# Patient Record
Sex: FEMALE | Race: WHITE
[De-identification: names, ages, dates, MRNs, and addresses within clinical notes are randomized per-mention and may not be internally consistent; named-entity substitution may affect disease eponyms.]

---

## 2020-02-22 ENCOUNTER — HOSPITAL ENCOUNTER (INPATIENT)
Dept: HOSPITAL 25 - ED | Age: 60
LOS: 3 days | Discharge: HOME | DRG: 190 | End: 2020-02-25
Attending: INTERNAL MEDICINE | Admitting: INTERNAL MEDICINE
Payer: COMMERCIAL

## 2020-02-22 DIAGNOSIS — Z82.49: ICD-10-CM

## 2020-02-22 DIAGNOSIS — J44.9: ICD-10-CM

## 2020-02-22 DIAGNOSIS — I21.4: Primary | ICD-10-CM

## 2020-02-22 DIAGNOSIS — I10: ICD-10-CM

## 2020-02-22 DIAGNOSIS — F17.210: ICD-10-CM

## 2020-02-22 DIAGNOSIS — Z83.3: ICD-10-CM

## 2020-02-22 DIAGNOSIS — I25.10: ICD-10-CM

## 2020-02-22 LAB
ALBUMIN SERPL BCG-MCNC: 4.1 G/DL (ref 3.2–5.2)
ALBUMIN/GLOB SERPL: 1.2 {RATIO} (ref 1–3)
ALP SERPL-CCNC: 89 U/L (ref 34–104)
ALT SERPL W P-5'-P-CCNC: 18 U/L (ref 7–52)
ANION GAP SERPL CALC-SCNC: 8 MMOL/L (ref 2–11)
AST SERPL-CCNC: 19 U/L (ref 13–39)
BASOPHILS # BLD AUTO: 0 10^3/UL (ref 0–0.2)
BASOPHILS # BLD AUTO: 0.1 10^3/UL (ref 0–0.2)
BUN SERPL-MCNC: 16 MG/DL (ref 6–24)
BUN SERPL-MCNC: 17 MG/DL (ref 6–24)
BUN/CREAT SERPL: 19.5 (ref 8–20)
CALCIUM SERPL-MCNC: 9.1 MG/DL (ref 8.6–10.3)
CHLORIDE SERPL-SCNC: 107 MMOL/L (ref 101–111)
EOSINOPHIL # BLD AUTO: 0.1 10^3/UL (ref 0–0.6)
EOSINOPHIL # BLD AUTO: 0.2 10^3/UL (ref 0–0.6)
GLOBULIN SER CALC-MCNC: 3.3 G/DL (ref 2–4)
GLUCOSE SERPL-MCNC: 95 MG/DL (ref 70–100)
HCO3 SERPL-SCNC: 21 MMOL/L (ref 22–32)
HCT VFR BLD AUTO: 39 % (ref 35–47)
HCT VFR BLD AUTO: 39 % (ref 35–47)
HGB BLD-MCNC: 13 G/DL (ref 12–16)
HGB BLD-MCNC: 13.1 G/DL (ref 12–16)
LYMPHOCYTES # BLD AUTO: 1.6 10^3/UL (ref 1–4.8)
LYMPHOCYTES # BLD AUTO: 2.9 10^3/UL (ref 1–4.8)
MCH RBC QN AUTO: 28 PG (ref 27–31)
MCH RBC QN AUTO: 29 PG (ref 27–31)
MCHC RBC AUTO-ENTMCNC: 34 G/DL (ref 31–36)
MCHC RBC AUTO-ENTMCNC: 34 G/DL (ref 31–36)
MCV RBC AUTO: 84 FL (ref 80–97)
MCV RBC AUTO: 85 FL (ref 80–97)
MONOCYTES # BLD AUTO: 0.7 10^3/UL (ref 0–0.8)
MONOCYTES # BLD AUTO: 0.7 10^3/UL (ref 0–0.8)
NEUTROPHILS # BLD AUTO: 3.6 10^3/UL (ref 1.5–7.7)
NEUTROPHILS # BLD AUTO: 5.5 10^3/UL (ref 1.5–7.7)
NRBC # BLD AUTO: 0 10^3/UL
NRBC # BLD AUTO: 0 10^3/UL
NRBC BLD QL AUTO: 0
NRBC BLD QL AUTO: 0.1
PLATELET # BLD AUTO: 318 10^3/UL (ref 150–450)
PLATELET # BLD AUTO: 347 10^3/UL (ref 150–450)
POTASSIUM SERPL-SCNC: 4.1 MMOL/L (ref 3.5–5)
PROT SERPL-MCNC: 7.4 G/DL (ref 6.4–8.9)
RBC # BLD AUTO: 4.59 10^6 /UL (ref 3.7–4.87)
RBC # BLD AUTO: 4.61 10^6 /UL (ref 3.7–4.87)
SODIUM SERPL-SCNC: 136 MMOL/L (ref 135–145)
TROPONIN I SERPL-MCNC: 0 NG/ML (ref ?–0.03)
TROPONIN I SERPL-MCNC: 0.07 NG/ML (ref ?–0.03)
TROPONIN I SERPL-MCNC: 0.51 NG/ML (ref ?–0.03)
WBC # BLD AUTO: 7.5 10^3/UL (ref 3.5–10.8)
WBC # BLD AUTO: 7.9 10^3/UL (ref 3.5–10.8)

## 2020-02-22 PROCEDURE — 80053 COMPREHEN METABOLIC PANEL: CPT

## 2020-02-22 PROCEDURE — 84443 ASSAY THYROID STIM HORMONE: CPT

## 2020-02-22 PROCEDURE — 80048 BASIC METABOLIC PNL TOTAL CA: CPT

## 2020-02-22 PROCEDURE — 99157 MOD SED OTHER PHYS/QHP EA: CPT

## 2020-02-22 PROCEDURE — 71046 X-RAY EXAM CHEST 2 VIEWS: CPT

## 2020-02-22 PROCEDURE — 84484 ASSAY OF TROPONIN QUANT: CPT

## 2020-02-22 PROCEDURE — G0378 HOSPITAL OBSERVATION PER HR: HCPCS

## 2020-02-22 PROCEDURE — 85379 FIBRIN DEGRADATION QUANT: CPT

## 2020-02-22 PROCEDURE — 93306 TTE W/DOPPLER COMPLETE: CPT

## 2020-02-22 PROCEDURE — 82565 ASSAY OF CREATININE: CPT

## 2020-02-22 PROCEDURE — 82248 BILIRUBIN DIRECT: CPT

## 2020-02-22 PROCEDURE — 99156 MOD SED OTH PHYS/QHP 5/>YRS: CPT

## 2020-02-22 PROCEDURE — 85025 COMPLETE CBC W/AUTO DIFF WBC: CPT

## 2020-02-22 PROCEDURE — 93454 CORONARY ARTERY ANGIO S&I: CPT

## 2020-02-22 PROCEDURE — 84520 ASSAY OF UREA NITROGEN: CPT

## 2020-02-22 PROCEDURE — 99284 EMERGENCY DEPT VISIT MOD MDM: CPT

## 2020-02-22 PROCEDURE — 80061 LIPID PANEL: CPT

## 2020-02-22 PROCEDURE — 83036 HEMOGLOBIN GLYCOSYLATED A1C: CPT

## 2020-02-22 PROCEDURE — 93005 ELECTROCARDIOGRAM TRACING: CPT

## 2020-02-22 PROCEDURE — 85730 THROMBOPLASTIN TIME PARTIAL: CPT

## 2020-02-22 PROCEDURE — 36415 COLL VENOUS BLD VENIPUNCTURE: CPT

## 2020-02-22 RX ADMIN — WATER SCH NOTE: 100 INJECTION, SOLUTION INTRAVENOUS at 20:40

## 2020-02-22 RX ADMIN — ATORVASTATIN CALCIUM SCH MG: 40 TABLET, FILM COATED ORAL at 20:29

## 2020-02-22 RX ADMIN — METOPROLOL TARTRATE SCH MG: 25 TABLET, FILM COATED ORAL at 20:33

## 2020-02-22 RX ADMIN — HEPARIN SODIUM AND DEXTROSE SCH MLS/HR: 5000; 5 INJECTION INTRAVENOUS at 20:24

## 2020-02-22 RX ADMIN — ACETAMINOPHEN PRN MG: 325 TABLET ORAL at 20:28

## 2020-02-22 RX ADMIN — NICOTINE SCH PATCH: 21 PATCH TRANSDERMAL at 17:19

## 2020-02-22 NOTE — ED
Shortness of Breath





- HPI Summary


HPI Summary: 





This pt is a 61 Y/O F presenting to [facility] with a CC of SOB that began at 0900 

this morning. Per EMS the pt was brought to the fire station by her  due 

to an increase in SOB and mid-sternal CP. She states that at the onset she had 

a headache. She states that she has never felt this way in the past. She 

reports that she had some diaphoresis during the episode. She denies any Edema, 

fevers, chills, MIs, N/V, and long distance travels. She states that her mother

s mom had a Hx of cardiac disease and had an MI at 70 ten. She states that she 

has been smoking cigarettes for a couple of years. She denies any PMHx. She has 

no aggravating factors. She states that her breathing is improving due to the 

O2. 





- History of Current Complaint


Chief Complaint: EDChestPainROMI


Time Seen by Provider: 20 11:17


Hx Obtained From: Patient, EMS


Onset/Duration: Sudden Onset, Lasting Hours - 2, Still Present


Timing: Constant


Current Severity: None


Dyspnea At: Rest


Aggravating Factors: Nothing


Alleviating Factors: Oxygen


Associated Signs & Symptoms: Negative - Edema, fevers, chills, MIs, N/V, and 

long distance travels., Chest Pain Unrelated to Cough, Diaphoresis





- Allergy/Home Medications


Allergies/Adverse Reactions: 


 Allergies











Allergy/AdvReac Type Severity Reaction Status Date / Time


 


No Known Allergies Allergy   Verified 20 11:17














PMH/Surg Hx/FS Hx/Imm Hx


Previously Healthy: Yes


Endocrine/Hematology History: 


   Denies: Hx Anemia


Cardiovascular History: 


   Denies: Hx Hypertension, Hx Valvular Heart Disease


Neurological History: 


   Denies: Hx Headaches


Psychiatric History: 


   Denies: Hx Autism





- Cancer History


Hx Chemotherapy: No


Hx Radiation Therapy: No





- Surgical History


Surgical History: None





- Immunization History


Immunizations Up to Date: Yes


Infectious Disease History: No


Infectious Disease History: 


   Denies: Traveled Outside the [place] in Last 30 Days





- Family History


Known Family History: Positive: Cardiac Disease





- Social History


Occupation: Employed Full-time


Lives: With Family


Alcohol Use: None


Hx Substance Use: No


Substance Use Type: Reports: None


Hx Tobacco Use: Yes


Smoking Status (MU): Current Every Day Smoker





Review of Systems


Constitutional: Negative - long distance travels


Positive: Skin Diaphoresis.  Negative: Fever, Chills


Positive: Chest Pain


Positive: Shortness Of Breath


Negative: Vomiting, Nausea


Negative: Edema


Negative: Headache


All Other Systems Reviewed And Are Negative: Yes





Physical Exam





- Summary


Physical Exam Summary: 


Constitutional: Well-developed, Well-nourished, Alert. (-) Distressed


Skin: Warm, Dry


HENT: Normocephalic; Atraumatic


Eyes: Conjunctiva normal


Neck: Musculoskeletal ROM normal neck. (-) JVD, (-) Stridor, (-) Tracheal 

deviation


Cardio: Rhythm regular, rate normal, Heart sounds normal; Intact distal pulses; 

The pedal pulses are 2+ and symmetric. Radial pulses are 2+ and symmetric. (-) 

Murmur


Pulmonary/Chest wall: Effort normal. (-) Respiratory distress, (-) Wheezes, (-) 

Rales


Abd: Soft, (-) tenderness, (-) Distension, (-) Guarding, (-) Rebound


Musculoskeletal: (-) Edema


Lymph: (-) Cervical adenopathy


Neuro: Alert, Oriented x3


Psych: Mood and affect Normal





Triage Information Reviewed: Yes


Vital Signs On Initial Exam: 


 Initial Vitals











Temp Pulse Resp BP Pulse Ox


 


 99.0 F   95   24   175/102   100 


 


 20 11:12  20 11:12  20 11:12  20 11:12  20 11:12











Vital Signs Reviewed: Yes





Procedures





- Sedation


Patient Received Moderate/Deep Sedation with Procedure: No





Diagnostics





- Vital Signs


 Vital Signs











  Temp Pulse Resp BP Pulse Ox


 


 20 11:12  99.0 F  95  24  175/102  100














- Laboratory


Result Diagrams: 


 20 11:26





 20 11:26


Lab Statement: Any lab studies that have been ordered have been reviewed, and 

results considered in the medical decision making process.





- Radiology


  ** CXR


Radiology Interpretation Completed By: Radiologist


Summary of Radiographic Findings: HYPERINFLATION, CONSISTENT WITH COPD. NO 

ACTIVE CARDIOPULMONARY DISEASE.  ED physician has reviewed this report.





- EKG


  ** 1115


Cardiac Rate: NL - 89 BPM


EKG Rhythm: Sinus Rhythm


ST Segment: Other


Summary of EKG Findings: EKG at 1115 reveals normal sinus tachycardia with rate 

of 89 BPM, 1 mm ST elevation in lead 2, 3. No reciprocal changes, no ischemic 

changes. This EKG was reviewed and interpreted by Dr. Dias, 1116 2020.





Re-Evaluation





- Re-Evaluation


  ** First Eval


Re-Evaluation Time: 14:50


Comment: Discussed plan for admission d/t elevated second trop of 0.07, patient 

agreeable with plan, ASA ordered





Course/Dx





- Course


Course Of Treatment: Patient is here with a roughly 10 minutes of chest pain 

with accompanied shortness of breath.  Episode occurred at 9 AM.  Patient has 

been going through a lot of stress as her brother-in-law  last week.  

Patient was chest pain-free upon arrival but was still mildly short of breath.  

Patient had a d-dimer which was negative.  Patient had an EKG which showed no 

evidence of ischemia.  Patient's initial troponin was negative.  Patient's 

second troponin was positive so she was admitted to the hospital for further 

workup.  Patient received aspirin in the ED.  Patient was not started on 

heparin after discussion was had with the hospitalist about starting it.





- Diagnoses


Provider Diagnoses: 


 NSTEMI (non-ST elevated myocardial infarction)








- Physician Notifications


Discussed Care of Patient With: Esther Gaffney - hospitalist


Time Discussed With Above Provider: 15:05


Instructed by Provider To: Other - I discussed the patients case with Dr. Gaffney

, who accepts the patient for admission.





Discharge ED





- Sign-Out/Discharge


Documenting (check all that apply): Patient Departure - Patient accepted for 

admission by Dr. Gaffney.





- Discharge Plan


Condition: Stable


Disposition: ADMITTED TO [facility]





- Billing Disposition and Condition


Condition: STABLE


Disposition: Admitted to [facility]





- Attestation Statements


Document Initiated by Chocoe: Yes


Documenting Scribe: Serina Vergara


Provider For Whom Eloy is Documenting (Include Credential): Jeff Dias MD


Scribe Attestation: 


Sherif NICOLE Natalie George, scribed for Jeff Dias MD on 20 at 

1825. 


Scribe Documentation Reviewed: Yes


Provider Attestation: 


The documentation as recorded by the eloy, Serina Vergara 

accurately reflects the service I personally performed and the decisions made 

by me, Jeff Dias MD


Status of Scribe Document: Viewed

## 2020-02-22 NOTE — HP
HISTORY AND PHYSICAL:

 

DATE OF ADMISSION:  20

 

PRIMARY CARE PROVIDER:  The patient has no primary care provider, therefore she 
should be referred to [facility] upon discharge.

 

ATTENDING PHYSICIAN:  Esther Gaffney MD * (dictated by Rea Antunez NP)
.

 

CHIEF COMPLAINT:

1.  Shortness of breath.

2.  Chest pain.

3.  Headache.

 

HISTORY OF PRESENT ILLNESS:  Mrs. Mitchell is a 60-year-old female with no past 
medical history who presented to the emergency department today after 
experiencing shortness of breath, chest pain, and headache.  The patient 
reports that she has been dealing with lot of stress recently and her brother 
passed away and she has been dealing with arranging for his .  The 
patient reports she has had a headache for about 4 days "off and on."  She 
reports she suddenly had worse headache today that lasted about 30 minutes.  
During this headache, she started to have chest "tightness" and then could not 
catch her breath.  She reports these symptoms started about 09:00 and lasted to 
about 10:30 when her  arrived home.  She reported they decreased in 
severity when her  arrived home.  She reports they finally went away 
when she was en route to the ER in the ambulance. She reports they did not give 
her any intervention besides oxygen in the ambulance. She reports associated 
symptom was diaphoresis.  She also reports that she has had an occasional cough 
only in the morning.  She reports she has also had some nausea en route to the 
hospital.  The patient denies fever, anorexia, edema, hemoptysis, vomiting, 
diarrhea, abdominal pain, gross hematuria, dysuria, focal weakness, visual 
changes, sore throat, arthralgias, myalgias, rashes, lesions.  The patient 
reports recently she has noted her right middle and ring finger become white 
and cold and tingly and this lasted few minutes.  She reports this has been 
going on for couple of weeks.

 

While here in the emergency department, the patient has received 324 of 
aspirin.  She has had a CBC, which is unremarkable.  She has had a D-dimer, 
which is unremarkable.  She had a CMP, which was fairly unremarkable.  Her 
initial troponin was 0.00 at 11:26, but her repeat troponin was 0.07 at 14:14.  
The patient's EKG revealed sinus rhythm with nonspecific ST changes in V1 and 
V2.  The patient had a chest x-ray, which showed hyperinflation consistent with 
COPD.  Given these findings, the hospitalists were asked to consult for 
admission.

 

It should also be noted that the patient has reported decreased exercise 
endurance in the past couple of months.  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 
becomes more short of breath with activities than she normally would.  She 
reports she initially attributed this to a GI bug that she had in January.

 

PAST MEDICAL HISTORY:  The patient reports she has no past medical history.  It 
should also be noted that the patient has not seen a physician.

 

PAST SURGICAL HISTORY:

1.   x3.

2.  Tonsillectomy.

 

HOME MEDICATIONS:  Ibuprofen 200 mg as needed.

 

ALLERGIES:  No known drug allergies.

 

FAMILY HISTORY:  Mother had a heart attack in her 70s.  The patient has no 
information on her father.  The patient's biological mother had diabetes.  
There is no cancer in the family.

 

SOCIAL HISTORY:  The patient is a smoker.  The patient smokes a pack a day for 
approximately 40 years.  The patient denies alcohol use.  The patient denies 
drug use.  The patient works as a health .  The patient is .  The 
patient is a full code.  The patient's surrogate decision maker will be her 
, Clint Mitchell, [phone] in the event she cannot make decisions for 
herself.

 

REVIEW OF SYSTEMS:  A 14-point review of systems was performed and all 
pertinent positive and negative findings are in the HPI.  All others are 
negative.

 

                               PHYSICAL EXAMINATION

 

GENERAL:  Mrs. Mitchell is lying in bed.  She appears to be in no acute distress.  
She appears stated age.

 

VITAL SIGNS:  Temp 99.0, HR 79, RR 15, O2 saturation of 100% on room air, BP is 
180/100.

 

HEENT:  PERRLA.  EOMs intact.  Oral mucosa is moist without lesion.  Pharynx is 
clear.

 

NECK:  No lymphadenopathy.

 

RESPIRATORY:  Symmetrical chest expansion.  No accessory muscle use.

 

LUNGS:  Clear to auscultation.  No rhonchi, wheezes, or rubs.

 

CV:  Regular rate and rhythm.  S1, S2 present.  No murmurs, rubs, or gallops.

 

ABDOMEN:  Soft, nontender to palpation.  Bowel sounds normoactive.

 

EXTREMITIES:  Skin is warm and smooth bilaterally.  No edema.  Pedal pulses 2+ 
bilaterally.

 

MUSCULOSKELETAL:  No pain or deformities.

 

NEURO:  Awake, alert, oriented x4.  Strength is 5/5 in upper and lower 
extremities.

 

SKIN:  Grossly intact without lesions.

 

 DIAGNOSTIC STUDIES/LABORATORY DATA:  WBC 7.9, hemoglobin 13.0, hematocrit 39, 
platelets 318.  Sodium 139, potassium 4.1, chloride 107, carbon dioxide 21, BUN 
16, creatinine 0.82.  Troponin 0.00 and 0.07.

 

EKG:  Sinus rhythm, nonspecific ST changes in V1 and V2.

 

Chest x-ray:  Hyperinflation.

 

ASSESSMENT AND PLAN:  Mrs. Mitchell is a 60-year-old female with no past medical 
history who presented to the emergency department today with complaints of 
chest pain, shortness of breath, headache.  The patient will be admitted OBV:

 

1.  Chest pain.  The patient had an EKG in the emergency department, which 
revealed sinus rhythm with nonspecific ST changes in V1 and V2.  The patient 
had initial troponin which was 0.00 and repeat troponin, which was 0.07.  We 
have asked the lab to rerun 0.07 to address its accuracy.  We have also ordered 
additional troponin to be drawn tonight.  The patient will have EKGs 
accompanying her troponins.  The patient will be placed on tele.  The patient 
will have an echocardiogram.  The patient will have stress test on Monday.  The 
patient is started on aspirin 81 mg.  The patient received aspirin 324 mg while 
in the emergency department.  The patient will be started on beta blocker.  The 
patient will have lipid panel in the morning.  TSH ordered.  Hemoglobin A1c 
ordered.

2.  Shortness of breath.  The patient's chest x-ray is unremarkable.  The 
patient has a history of smoking.  The patient is oxygenating well on room air.
  We will continue to monitor.  We will complete the cardiac workup.

3.  Headache.  The patient is currently headache-free.  I will order Tylenol as 
needed.  The patient's neuro exam is unremarkable.

4.  Hypertension.  The patient is noted to be hypertensive here in the 
emergency department.  I have ordered the patient to be started on beta 
blocker.  I have also ordered hydralazine p.r.n.

5.  Smoking.  The patient has significant smoking history.  I will order 
nicotine patch.

6.  FEN:  The patient will be placed on heart-healthy diet and be n.p.o. after 
midnight on .

7.  Code status:  The patient is a full code.

8.  DVT prophylaxis:  Based on the DVT Risk Assessment, the patient is low 
risk.  I will order SCDs.

 

TIME SPENT:  Approximately 65 minutes was spent on this admission, greater than 
half the time was spent with patient and her , obtaining my history and 
performing physical exam and reviewing my plan of care.

 

This case has been reviewed with my attending, Dr. Gaffney, who is in agreement 
with my plan of care.

 

 Reviewed by REA ANTUNEZ NP 3/7/20 @ 0838

 

[id number]/CPS #: [id number]

[email or web address]

## 2020-02-23 LAB
ALBUMIN SERPL BCG-MCNC: 4.2 G/DL (ref 3.2–5.2)
ALBUMIN/GLOB SERPL: 1.3 {RATIO} (ref 1–3)
ALP SERPL-CCNC: 84 U/L (ref 34–104)
ALT SERPL W P-5'-P-CCNC: 17 U/L (ref 7–52)
ANION GAP SERPL CALC-SCNC: 7 MMOL/L (ref 2–11)
AST SERPL-CCNC: 20 U/L (ref 13–39)
BASOPHILS # BLD AUTO: 0.1 10^3/UL (ref 0–0.2)
BUN SERPL-MCNC: 18 MG/DL (ref 6–24)
BUN/CREAT SERPL: 24.7 (ref 8–20)
CALCIUM SERPL-MCNC: 9.1 MG/DL (ref 8.6–10.3)
CHLORIDE SERPL-SCNC: 110 MMOL/L (ref 101–111)
CHOLEST SERPL-MCNC: 196 MG/DL
EOSINOPHIL # BLD AUTO: 0.3 10^3/UL (ref 0–0.6)
GLOBULIN SER CALC-MCNC: 3.2 G/DL (ref 2–4)
GLUCOSE SERPL-MCNC: 90 MG/DL (ref 70–100)
HCO3 SERPL-SCNC: 21 MMOL/L (ref 22–32)
HCT VFR BLD AUTO: 38 % (ref 35–47)
HDLC SERPL-MCNC: 42.6 MG/DL
HGB BLD-MCNC: 13.1 G/DL (ref 12–16)
LYMPHOCYTES # BLD AUTO: 2.7 10^3/UL (ref 1–4.8)
MCH RBC QN AUTO: 29 PG (ref 27–31)
MCHC RBC AUTO-ENTMCNC: 34 G/DL (ref 31–36)
MCV RBC AUTO: 85 FL (ref 80–97)
MONOCYTES # BLD AUTO: 0.5 10^3/UL (ref 0–0.8)
NEUTROPHILS # BLD AUTO: 2.6 10^3/UL (ref 1.5–7.7)
NRBC # BLD AUTO: 0 10^3/UL
NRBC BLD QL AUTO: 0
PLATELET # BLD AUTO: 327 10^3/UL (ref 150–450)
POTASSIUM SERPL-SCNC: 3.8 MMOL/L (ref 3.5–5)
PROT SERPL-MCNC: 7.4 G/DL (ref 6.4–8.9)
RBC # BLD AUTO: 4.5 10^6 /UL (ref 3.7–4.87)
SODIUM SERPL-SCNC: 138 MMOL/L (ref 135–145)
TRIGL SERPL-MCNC: 65 MG/DL
TROPONIN I SERPL-MCNC: 0.19 NG/ML (ref ?–0.03)
TSH SERPL-ACNC: 0.65 MCIU/ML (ref 0.34–5.6)
WBC # BLD AUTO: 6.2 10^3/UL (ref 3.5–10.8)

## 2020-02-23 RX ADMIN — ATORVASTATIN CALCIUM SCH MG: 40 TABLET, FILM COATED ORAL at 20:44

## 2020-02-23 RX ADMIN — NICOTINE SCH PATCH: 21 PATCH TRANSDERMAL at 08:46

## 2020-02-23 RX ADMIN — ASPIRIN SCH MG: 81 TABLET, CHEWABLE ORAL at 08:46

## 2020-02-23 RX ADMIN — METOPROLOL TARTRATE SCH MG: 25 TABLET, FILM COATED ORAL at 08:45

## 2020-02-23 RX ADMIN — WATER SCH NOTE: 100 INJECTION, SOLUTION INTRAVENOUS at 20:45

## 2020-02-23 RX ADMIN — METOPROLOL TARTRATE SCH MG: 25 TABLET, FILM COATED ORAL at 20:44

## 2020-02-23 NOTE — PN
Subjective


Date of Service: 20


Interval History: 





Evaluated patient this morning. Tells me she no longer has shortness of breath 

and denies chest pain/pressure. She denies abd pain, dizziness/lightheadedness, 

fever/chills. Mentions headache but believes it is because she has not had 

coffee this AM.





Objective


Active Medications: 








Acetaminophen (Tylenol Tab*)  650 mg PO Q6H PRN


   PRN Reason: PAIN - MILD


   Last Admin: 20 20:28 Dose:  650 mg


Aspirin (Aspirin 81 Mg Chew Tab*)  81 mg PO DAILY [facility]


   Last Admin: 20 08:46 Dose:  81 mg


Atorvastatin Calcium (Lipitor*)  40 mg PO 2100 [facility]


   Last Admin: 20 20:29 Dose:  40 mg


Heparin Sodium (Porcine) (Heparin Vial(*))  0 - 3,350 units IV .PER SLIDING 

SCALE PRN


   PRN Reason: SLIDING SCALE


   Last Admin: 20 20:24 Dose:  3,350 units


Hydralazine HCl (Apresoline Iv*)  5 mg IV SLOW PU Q6H PRN


   PRN Reason: BLOOD PRESSURE


   Last Admin: 20 17:20 Dose:  5 mg


Heparin Sodium/Dextrose (Heparin Drip 25,000 Units(*))  25,000 units in 500 mls 

@ 0 mls/hr IV PER RATE [facility]; Protocol


   Last Admin: 20 20:24 Dose:  13 mls/hr


Metoprolol Tartrate (Lopressor Tab*)  12.5 mg PO Q12HR [facility]


   Last Admin: 20 08:45 Dose:  12.5 mg


Nicotine (Nicotine Patch 21 Mg/24 Hr*)  1 patch TRANSDERM DAILY@0800 [facility]


   Last Admin: 20 08:46 Dose:  1 patch


Pharmacy Profile Note (Nicotine Patch Removal Note*)  1 note FOLLOW UP  [facility]


   Last Admin: 20 20:40 Dose:  1 note








 Vital Signs - 8 hr











  20





  07:55 08:00


 


Temperature 98.0 F 


 


Pulse Rate 67 


 


Respiratory 20 20





Rate  


 


Blood Pressure 146/56 





(mmHg)  


 


O2 Sat by Pulse 98 





Oximetry  











Oxygen Devices in Use Now: None


Appearance: Thin, white female who appears older than stated age, laying in bed

, appearing comfortable and in NAD


Eyes: No Scleral Icterus, - - PERRL


Ears/Nose/Mouth/Throat: Mucous Membranes Moist


Neck: Trachea Midline


Respiratory: Symmetrical Chest Expansion and Respiratory Effort, Clear to 

Auscultation


Cardiovascular: NL Sounds; No Murmurs; No JVD, RRR


Abdominal: - - abd soft, nontender, nondistended


Extremities: No Edema, No Clubbing, Cyanosis


Skin: No Rash or Ulcers


Neurological: Alert and Oriented x 3


Result Diagrams: 


 20 05:15





 20 09:19





Assess/Plan/Problems-Billing


Assessment: 





61 yo female with PMHx tobacco use presents from brother's  with 

shortness of breath and chest pain.





- Patient Problems


(1) Elevated troponin


Current Visit: Yes   Status: Acute   Code(s): R79.89 - OTHER SPECIFIED ABNORMAL 

FINDINGS OF BLOOD CHEMISTRY   SNOMED Code(s): 781644709


   Comment: 


-presented from brother's  with chest pain and shortness of breath


-troponin peaked to 0.51, has since downtrended to 0.19


-EKG without significant changes per Dr. Krishna's read, though there do appear 

to be some nonspecific changes in anterior leads


-TTE without regional wall motion abnormalities and EF wnl


-continue hepartin gtt


-differential includes NSTEMI vs Takutsubo cardiomyopathy


-no sxs today


-continue tele


-plan for stress test tomorrow but Dr. Krishna plans to discuss with cardiology 

team, perhaps cath tomorrow instead


-continue new BB, statin, ASA   





(2) Tobacco use


Current Visit: Yes   Status: Acute   Code(s): Z72.0 - TOBACCO USE   SNOMED Code(

s): 513918883


   Comment: 


-continue nicotine replacement   





(3) Headache


Current Visit: Yes   Status: Acute   Code(s): R51 - HEADACHE   SNOMED Code(s): 

74456260


   Comment: 


-c/o headache this AM, patient OK to have caffeine today and resolved after 

coffee   





(4) DVT prophylaxis


Current Visit: Yes   Status: Acute   Code(s): Z29.9 - ENCOUNTER FOR 

PROPHYLACTIC MEASURES, UNSPECIFIED   SNOMED Code(s): 972886196


   Comment: 


-on heparin gtt   





(5) Full code status


Current Visit: Yes   Status: Acute   Code(s): Z78.9 - OTHER SPECIFIED HEALTH 

STATUS   SNOMED Code(s): 558570039


   


Status and Disposition: 





inpatient on heparin gtt

## 2020-02-23 NOTE — ECHO
*[facility]*

[place] [address]

Phone: [phone]

Fax #: [phone]



-------------------------------------------------------------------

Transthoracic Echocardiogram



Patient: Palma Mitchell                      MRN:        [id number]

:     1960                         Study Date: 2020

Age:     60                                 Accession#: [id number]

Gender:  F                                  HR:         65 bpm

Height:  65 in /165.1 cm                    BSA:        1.59 m^2

Weight:  119.8 lb /54.4 kg                  BMI:        20 kg/m^2



*Sonographer: * Tessie Ibarra [facility]

 

*Referring Physician: * Gloria Neal

*Reading Physician: * Jason Krishna MD



-------------------------------------------------------------------

Indications:   Chest Pain, unspecified.



-------------------------------------------------------------------

History:   Risk factors:   Current tobacco use.



-------------------------------------------------------------------

Conclusions



Summary:



- Left ventricle: Systolic function is normal. The estimated

  ejection fraction is 55-60%. Wall motion is normal; there are no

  regional wall motion abnormalities.

- Mitral valve: There is trace regurgitation.

- Aortic valve: There is no evidence of stenosis.

- Tricuspid valve: There is trace regurgitation.

- Pulmonary arteries: Systolic pressure can not be accurately

  estimated.

- Study data: No prior study is available for comparison.



-------------------------------------------------------------------

Study data:  Transthoracic echocardiogram.  Procedure:

Transthoracic echocardiography was performed. Image quality was

fair. The study was technically limited due to Smoking history.

Complete 2D, spectral Doppler, and color flow Doppler.  Location:

Bedside.  Patient status:  Inpatient. Patient room number: 453. No

prior study is available for comparison.  Rhythm:  Normal sinus

rhythm.



-------------------------------------------------------------------

Findings



Left ventricle:  The cavity size is below normal. Wall thickness is

normal. Systolic function is normal. The estimated ejection

fraction is 55-60%. Wall motion is normal; there are no regional

wall motion abnormalities. Left ventricular diastolic function

parameters are normal.

Right ventricle:  The cavity size is normal. Systolic function is

normal.

Left atrium:  The atrium is normal in size.

Right atrium:  The atrium is normal in size.

Mitral valve:  The leaflets are mildly thickened.  There is no

evidence of stenosis.   There is trace regurgitation.

Aortic valve:   The valve is trileaflet. The leaflets are normal

thickness.  There is no evidence of stenosis.   There is no

significant regurgitation.

Tricuspid valve:  The leaflets are normal thickness.  There is no

evidence of stenosis.   There is trace regurgitation.

Pulmonic valve:   The leaflets are normal thickness.  There is no

evidence of stenosis.   There is no significant regurgitation.

Aorta:  Aortic root: The aortic root is appears normal.

Ascending aorta: The ascending aorta is appears normal.

Aortic arch: The aortic arch is appears normal.

Pericardium:  There is no significant pericardial effusion.

Pulmonary arteries:

The main pulmonary artery is normal-sized.  Systolic pressure can

not be accurately estimated.

Systemic veins:

Inferior vena cava: The vessel is normal in size. There is (&gt;= 50%)

respiratory change in the IVC dimension.



-------------------------------------------------------------------

Measurements



 Left ventricle            Value        Ref         Right atrium              Value       Ref

 JESU, LAX          (L)     3.5   cm     3.8 - 5.2   SI dim, ES                4.2   cm    3.4 - 5.3

 ESD, LAX                  2.4   cm     2.2 - 3.5   ML dim, ES, A4C           3.5   cm    2.6 - 4.4

 FS, LAX                   31    %           Estimated RAP             3     mm Hg ---------

 PW, ED, LAX               0.7   cm     0.6 - 0.9

 FS                        31    %           Aortic valve              Value       Ref

 Mid-wall FS               15    %      ----------  Diane diam, ED              2.0   cm    ---------

 PW, ED                    0.7   cm     0.6 - 0.9   Peak v, S                 0.99  m/sec ---------

 E&apos;, lat diane, TDI  (L)     8.8   cm/sec &gt;=10.0      VTI, S                    21.9  cm    ---
------

 E/e&apos;, lat diane,            11           ----------  Mean grad, S              2.0   mm Hg ------
---

 TDI                                                Peak grad, S              4.0   mm Hg ---------

 E&apos;, med diane, TDI          9.6   cm/sec &gt;=7.0       LVOT/AV, VTI ratio        0.87        ---
------

 E/e&apos;, med diane,            10           ----------

 TDI                                                Mitral valve              Value       Ref

 E&apos;, avg, TDI              9.2   cm/sec ----------  Peak E                    0.93  m/sec ------
---

 E/e&apos;, avg, TDI            10           &lt;=14        Peak A                    0.81  m/sec ---
------

                                                    Decel time                158   ms    ---------

 LVOT                      Value        Ref         Peak grad, D              3.5   mm Hg ---------

 Peak familia, S               0.84  m/sec  ----------  Peak E/A ratio            1.2         ---------

 VTI, S                    19.0  cm     ----------

 Mean grad, S              1     mm Hg  ----------  Pulmonic valve            Value       Ref

                                                    Peak v, S                 0.75  m/sec ---------

 Ventricular septum        Value        Ref         Peak grad, S              2.0   mm Hg ---------

 IVS, ED                   0.8   cm     0.6 - 0.9

                                                    Aortic root               Value       Ref

 Right ventricle           Value        Ref         Root diam                 2.7   cm    &lt;3.8

 JESU, LAX                  3.3   cm     ----------

 JESU minor ax, A4C         3.1   cm     1.9 - 3.5   Ascending aorta           Value       Ref

 mid                                                AAo AP diam, S            2.5   cm    ---------

 

 Left atrium               Value        Ref         Decending aorta           Value       Ref

 AP dim, ES                3.20  cm     2.70 -      Jose peak familia              0.9   m/sec ---------

                                        3.80

 ML dim, A4C               3.3   cm     ----------  Inferior vena cava        Value       Ref

 SI dim, A4C               4.3   cm     ----------  Diam                      1.5   cm    ---------

 Vol/bsa, ES, 1-p          15    ml/m^2 11 - 40

 A4C

 Vol/bsa, ES, A/L          23    ml/m^2 16 - 34

 

Legend:

(L)  and  (H)  elba values outside specified reference range.



Prepared and electronically signed by



Jason Krishna MD

2020 10:42

## 2020-02-23 NOTE — CONS
CARDIOLOGY CONSULTATION:

 

DATE OF CONSULT:  20

 

INDICATION FOR CONSULTATION:  Chest pain, abnormal troponin level.

 

HISTORY OF PRESENT ILLNESS:  The patient is a 60-year-old female who does not 
get regular medical care, who came to the emergency room because of shortness 
of breath and chest pain.  The patient was at a  the day before, of her 
brother-in- law.  She says that on the day of admission, she started having 
some shortness of breath and also some mild chest pain.  She also had a severe 
headache.  Her  got home and the patient looked very short of breath, 
pale.  He put her in the car and drove her to the [facility] Department.  
The initial EKG showed normal sinus rhythm without evidence of ST segment 
elevation.  She was transported to the emergency room.  In the emergency room, 
the patient was given a nebulizer, oxygen, with resolution of her shortness of 
breath.  Her chest pain had resolved when she got into the [facility] 
Department.  The patient continued to have her headache.  The patient's initial 
troponin level was 0.07.  Her peak troponin level was 0.51.  This morning her 
troponin level is down.  The patient does report increase in dyspnea on 
exertion over the last couple of months.  She denies any anginal type symptoms.
  She denies any lightheadedness, dizziness, or syncope.  She denies any 
palpitations.

 

PAST MEDICAL HISTORY:  Unremarkable.

 

PAST SURGICAL HISTORY:   x3.

 

MEDICATIONS:  None.

 

ALLERGIES:  None.

 

FAMILY HISTORY:  Mother had a heart attack in their 70s.

 

SOCIAL HISTORY:  She is a smoker.  She has been smoking a pack a day for 40 
years. She works as a health .  She is .

 

REVIEW OF SYSTEMS:  Negative for fevers and chills.  Negative for changes in 
bowel or bladder habits.  Negative for changes in weight.  Other 12-point 
review is unremarkable.

 

PHYSICAL EXAMINATION:  Height is 5 feet 5 inches, weight is 124 pounds, 
temperature 98, heart rate of 67, blood pressure 146/56, respiratory rate is 20
, oxygen saturation 98% on room air.  Sclerae anicteric.  Oropharynx is pink 
without erythema.  Carotids are 2+ with a soft bruit on the right side.  JVD is 
normal. Thyroid is normal.  Cardiac Exam:  S1, S2 without any murmurs, rubs, or 
gallops. Lungs are clear to auscultation bilaterally.  There are minimal 
rhonchi.  There are no rales on the exam.  There is no dullness to percussion.  
Abdomen is soft, nontender, nondistended with normoactive bowel sounds.  
Extremities show no edema. She has 2+ pulses throughout.  The patient is awake, 
alert, and oriented.  She moves all 4 extremities equally.

 

DIAGNOSTIC STUDIES/LAB DATA:  CBC within normal limits.  Chemistries within 
normal limits. BUN and creatinine are normal.  Again, peak troponin level is 
0.51. Troponin level today 0.19.  AST and ALT are normal.  Total cholesterol 
196.  LDL cholesterol of 140.

 

Echocardiogram today demonstrated normal LV size and systolic function.  No 
significant wall motion abnormalities.  No valvular abnormalities.

 

IMPRESSION:  This is a 60-year-old female who came to the emergency room 
because of shortness of breath and mild chest pain.  Her EKG shows normal sinus 
rhythm with no obvious ST-T wave abnormalities.  Her echocardiogram is normal.

 

At this point, it is unclear as to whether her symptoms are from a true acute 
coronary syndrome or whether it is due to Takotsubo's or broken heart syndrome.

 

She has a normal echocardiogram.

 

For now, my recommendation is the patient undergo an exercise nuclear stress 
test. This will be scheduled in the morning.  I will discuss the case with 
other physicians.  The patient may benefit from going directly to cardiac 
catheterization.  Further recommendations pending further discussions.  The 
patient is on heparin, aspirin, atorvastatin, and metoprolol.

 

 [id number]/[facility] #: [id number]

MTDD

## 2020-02-24 PROCEDURE — B211YZZ FLUOROSCOPY OF MULTIPLE CORONARY ARTERIES USING OTHER CONTRAST: ICD-10-PCS | Performed by: SPECIALIST

## 2020-02-24 PROCEDURE — 4A023N7 MEASUREMENT OF CARDIAC SAMPLING AND PRESSURE, LEFT HEART, PERCUTANEOUS APPROACH: ICD-10-PCS | Performed by: SPECIALIST

## 2020-02-24 RX ADMIN — ACETAMINOPHEN PRN MG: 325 TABLET ORAL at 22:10

## 2020-02-24 RX ADMIN — METOPROLOL TARTRATE SCH MG: 25 TABLET, FILM COATED ORAL at 08:50

## 2020-02-24 RX ADMIN — METOPROLOL TARTRATE SCH MG: 25 TABLET, FILM COATED ORAL at 22:10

## 2020-02-24 RX ADMIN — SODIUM CHLORIDE SCH MLS/HR: 900 IRRIGANT IRRIGATION at 14:03

## 2020-02-24 RX ADMIN — SODIUM CHLORIDE SCH MLS/HR: 900 IRRIGANT IRRIGATION at 19:34

## 2020-02-24 RX ADMIN — HEPARIN SODIUM AND DEXTROSE SCH MLS/HR: 5000; 5 INJECTION INTRAVENOUS at 11:29

## 2020-02-24 RX ADMIN — NICOTINE SCH PATCH: 21 PATCH TRANSDERMAL at 08:07

## 2020-02-24 RX ADMIN — CLOPIDOGREL SCH MG: 75 TABLET, FILM COATED ORAL at 14:35

## 2020-02-24 RX ADMIN — ASPIRIN SCH MG: 81 TABLET, CHEWABLE ORAL at 08:07

## 2020-02-24 RX ADMIN — ATORVASTATIN CALCIUM SCH MG: 40 TABLET, FILM COATED ORAL at 22:10

## 2020-02-24 RX ADMIN — WATER SCH NOTE: 100 INJECTION, SOLUTION INTRAVENOUS at 22:11

## 2020-02-24 NOTE — PN
Subjective


Date of Service: 20


Interval History: 





No further cp.no sob. Plan for cath this afternoon





Objective


Active Medications: 








Acetaminophen (Tylenol Tab*)  650 mg PO Q6H PRN


   PRN Reason: PAIN - MILD


   Last Admin: 20 20:28 Dose:  650 mg


Aspirin (Aspirin 81 Mg Chew Tab*)  81 mg PO DAILY [facility]


   Last Admin: 20 08:07 Dose:  81 mg


Atorvastatin Calcium (Lipitor*)  40 mg PO  [facility]


   Last Admin: 20 20:44 Dose:  40 mg


Clopidogrel Bisulfate (Plavix Tab*)  75 mg PO DAILY [facility]


Diazepam (Valium Tab(*))  5 mg PO ONCE PRN


   PRN Reason: On call to Cath Lab


Diphenhydramine HCl (Benadryl Po*)  25 mg PO ONCE PRN


   PRN Reason: On call to Cath Lab


Heparin Sodium (Porcine) (Heparin Vial(*))  0 - 3,350 units IV .PER SLIDING 

SCALE PRN


   PRN Reason: SLIDING SCALE


   Last Admin: 20 20:24 Dose:  3,350 units


Hydralazine HCl (Apresoline Iv*)  5 mg IV SLOW PU Q6H PRN


   PRN Reason: BLOOD PRESSURE


   Last Admin: 20 17:20 Dose:  5 mg


Heparin Sodium/Dextrose (Heparin Drip 25,000 Units(*))  25,000 units in 500 mls 

@ 0 mls/hr IV PER RATE [facility]; Protocol


   Last Admin: 20 11:29 Dose:  13 mls/hr


Sodium Chloride (Ns 0.9% 1000 Ml**)  1,000 mls @ 100 mls/hr IV .per rate [facility]


   Stop: 20 20:00


Metoprolol Tartrate (Lopressor Tab*)  12.5 mg PO Q12HR [facility]


   Last Admin: 20 08:50 Dose:  12.5 mg


Nicotine (Nicotine Patch 21 Mg/24 Hr*)  1 patch TRANSDERM DAILY@0800 [facility]


   Last Admin: 20 08:07 Dose:  1 patch


Pharmacy Profile Note (Nicotine Patch Removal Note*)  1 note FOLLOW UP  [facility]


   Last Admin: 20 20:45 Dose:  1 note








 Vital Signs - 8 hr











  20





  07:30 10:58


 


Temperature 97.6 F 97.9 F


 


Pulse Rate 63 62


 


Respiratory 18 18





Rate  


 


Blood Pressure 124/49 117/60





(mmHg)  


 


O2 Sat by Pulse 97 97





Oximetry  











Oxygen Devices in Use Now: None


Eyes: No Scleral Icterus


Ears/Nose/Mouth/Throat: NL Teeth, Lips, Gums


Neck: NL Appearance and Movements; NL JVP


Respiratory: Symmetrical Chest Expansion and Respiratory Effort, Clear to 

Auscultation


Cardiovascular: NL Sounds; No Murmurs; No JVD


Abdominal: NL Sounds; No Tenderness; No Distention


Extremities: No Edema


Neurological: Alert and Oriented x 3


Result Diagrams: 


 20 05:15





 20 05:58





Assess/Plan/Problems-Billing


Assessment: 





59 yo female with PMHx tobacco use presents from brother's  with 

shortness of breath and chest pain.





- Patient Problems


(1) Elevated troponin


Current Visit: Yes   Status: Acute   Code(s): R79.89 - OTHER SPECIFIED ABNORMAL 

FINDINGS OF BLOOD CHEMISTRY   SNOMED Code(s): 577988295


   Comment: -NSTEMI versus Takotsubo 


-presented from brother's  with chest pain and shortness of breath


-troponin peaked to 0.51, has since downtrended to 0.19


-EKG without significant changes per Dr. Krishna's read, though there do appear 

to be some nonspecific changes in anterior leads


-TTE without regional wall motion abnormalities and EF wnl


-continue hepartin gtt


-differential includes NSTEMI vs Takutsubo cardiomyopathy


-no sxs today


-continue tele


-plan for cardiac cath


-continue new BB, statin, ASA,heparin gtt   





(2) Tobacco use


Current Visit: Yes   Status: Acute   Code(s): Z72.0 - TOBACCO USE   SNOMED Code(

s): 376405998


   Comment: 


-continue nicotine replacement   





(3) DVT prophylaxis


Current Visit: Yes   Status: Acute   Code(s): Z29.9 - ENCOUNTER FOR 

PROPHYLACTIC MEASURES, UNSPECIFIED   SNOMED Code(s): 486909498


   Comment: 


-on heparin gtt   





(4) Full code status


Current Visit: Yes   Status: Acute   Code(s): Z78.9 - OTHER SPECIFIED HEALTH 

STATUS   SNOMED Code(s): 599674425


   


Status and Disposition: 





inpatient on heparin gtt

## 2020-02-24 NOTE — CATH
*[facility]*

[place] [address]

Main: [phone]

Fax: [phone]

[email or web address]



-------------------------------------------------------------------

Cardiac Catheterization



Patient: Palma Mitchell                      MRN:        [id number]

:     1960                         Study Date: 2020

Age:     60                                 Accession#: [id number]

Gender:  F                                  HR:

Height:  65 in /165.1 cm                    BSA:        1.61 m^2

Weight:  124.1 lb /56.4 kg                  BMI:        20.7 kg/m^2



Cardiologist:        Jason Krishna MD

 

Ordering Physician:  Jason Krishna MD

Referring Physician: Jason Krishna MD, Tomi MORENO, ---

 

-------------------------------------------------------------------



- Left coronary angiography.

- Right coronary angiography.



-------------------------------------------------------------------



Summary:



1. LAD: Proximal vessel lesion: There is a 50% stenosis. Mid-vessel

   lesion: There is a 50% stenosis.

2. Right coronary: Proximal vessel lesion: There is a 90% stenosis.



Recommendations:  Medical therapy Right coronary is too small a

vessel for intervention. Plavix for 6 months. ASA and Statins.

 

-------------------------------------------------------------------

History:   Risk factors:   Current tobacco use.



Labs, prior tests, procedures, and surgery:

Blood tests:    Troponin I (pre-procedure) of 0.19 ng/ml.  Partial

thromboplastin time (PTT) of 57.6 sec.  Serum potassium (K) of 3.8

mEq/l.  Serum sodium (Na) of 138 mEq/l.  Serum creatinine (current

admission) of 0.91 mg/dl.  Blood urea nitrogen of 18 mg/dl.

Glucose of 90 mg/dl.  Platelet count of 327 th/ul.  White blood

cell count (WBC) of 0.01 th/ul.  Red blood cell count (RBC) of 4500

th/ul.  Hematocrit of 38 %.  Hemoglobin (pre-procedure) of 13.1

g/dl.



-------------------------------------------------------------------

Study data:   Study status:  Cardiac cath: urgent.  Location:

Catheterization laboratory.  Consent:  The risks, benefits, and

alternatives to the procedure were explained to the patient and/or

their healthcare representative and written informed consent was

obtained. All available pre-procedure labs were reviewed.  Height:

165.1 cm. 65 in.  Weight:  56.4 kg. 124.1 lb.  Body surface area:

 1.61 m^2.  Body mass index:  20.7 kg/m^2.



-------------------------------------------------------------------

Procedure:



1. Initial setup. The patient was brought to the laboratory.

   Surface ECG leads, blood pressure measurements, and pulse

   oximetric signals were monitored. A baseline seven lead ECG was

   recorded. A time out was observed per protocol.

2. Skin preparation. The planned puncture sites were prepped and

   draped in the usual sterile manner.

3. Local anesthesia. 1% lidocaine was administered.

4. Sedation. was administered.

5. Local anesthesia. 1% lidocaine (2 ml) was administered.

6. Right radial artery access. A 6F Glidesheath Slender sheath was

   advanced into the vessel.

7. Selective left coronary angiography. A 5F TIG 4.0 catheter was

   advanced into the left coronary vessel ostium under fluoroscopic

   guidance. Contrast was injected. Images were obtained in

   multiple projections.

8. Selective right coronary angiography. A 5F AR 1 Impulse catheter

   was advanced into the right coronary vessel ostium under

   fluoroscopic guidance. Contrast was injected. Images were

   obtained in multiple projections.

9. Right radial artery hemostasis. Vessel closure was achieved with

   a Regular Vasc Band device.



Study completion:  Minimal estimated blood loss. All catheters

inserted during the procedure were removed. There were no apparent

complications.  Administered medications:   Heparin, infusion, at a

rate of 650units/hr, IV was discontinued.  (Radial) Nitroglycerin,

300mcg, intra-arterially.  (Radial) Verapamil, 3mg,

intra-arterially.  VERSED (Midazolam), for a total dose of 2mg, IV.

 Fentanyl, for a total dose of 50mcg, IV.  Contrast:   Omnipaque

350 50 ml (total dose).  Omnipaque 350 150 ml (wasted).  Radiation:

 Fluoroscopy dose: 66.2 cGy.  Discharge:  The patient tolerated the

procedure well and was discharged from the lab in stable condition.

 

-------------------------------------------------------------------

Findings



Coronary arteries:   The coronary circulation is left dominant.

Left main:  Moderately calcified.  Proximal vessel lesion: There is

a 30% stenosis.

LAD:  Mildly calcified.  Proximal vessel lesion: There is a 50%

stenosis.  Mid-vessel lesion: There is a 50% stenosis.

Left circumflex:  Distal vessel lesion: There is a 40% stenosis.

Right coronary:  Very small, 1 mm diameter.  Proximal vessel

lesion: There is a 90% stenosis.

Hemodynamics:



+-------------------------+-------------+

|Stage description        |Condition 1 -|

+-------------------------+-------------+

|Arterial pressure s/d (m)|155/60 (98)  |

+-------------------------+-------------+



Prepared and electronically signed by



Jason Krishna MD

2020 15:37

## 2020-02-25 VITALS — SYSTOLIC BLOOD PRESSURE: 138 MMHG | DIASTOLIC BLOOD PRESSURE: 66 MMHG

## 2020-02-25 LAB
ANION GAP SERPL CALC-SCNC: 6 MMOL/L (ref 2–11)
BASOPHILS # BLD AUTO: 0.1 10^3/UL (ref 0–0.2)
BUN SERPL-MCNC: 19 MG/DL (ref 6–24)
BUN/CREAT SERPL: 26 (ref 8–20)
CALCIUM SERPL-MCNC: 9 MG/DL (ref 8.6–10.3)
CHLORIDE SERPL-SCNC: 110 MMOL/L (ref 101–111)
EOSINOPHIL # BLD AUTO: 0.3 10^3/UL (ref 0–0.6)
GLUCOSE SERPL-MCNC: 89 MG/DL (ref 70–100)
HCO3 SERPL-SCNC: 22 MMOL/L (ref 22–32)
HCT VFR BLD AUTO: 38 % (ref 35–47)
HGB BLD-MCNC: 12.8 G/DL (ref 12–16)
LYMPHOCYTES # BLD AUTO: 2.4 10^3/UL (ref 1–4.8)
MCH RBC QN AUTO: 29 PG (ref 27–31)
MCHC RBC AUTO-ENTMCNC: 34 G/DL (ref 31–36)
MCV RBC AUTO: 85 FL (ref 80–97)
MONOCYTES # BLD AUTO: 0.6 10^3/UL (ref 0–0.8)
NEUTROPHILS # BLD AUTO: 3.8 10^3/UL (ref 1.5–7.7)
NRBC # BLD AUTO: 0 10^3/UL
NRBC BLD QL AUTO: 0
PLATELET # BLD AUTO: 304 10^3/UL (ref 150–450)
POTASSIUM SERPL-SCNC: 4.1 MMOL/L (ref 3.5–5)
RBC # BLD AUTO: 4.47 10^6 /UL (ref 3.7–4.87)
SODIUM SERPL-SCNC: 138 MMOL/L (ref 135–145)
WBC # BLD AUTO: 7.1 10^3/UL (ref 3.5–10.8)

## 2020-02-25 RX ADMIN — NICOTINE SCH PATCH: 21 PATCH TRANSDERMAL at 08:56

## 2020-02-25 RX ADMIN — ASPIRIN SCH MG: 81 TABLET, CHEWABLE ORAL at 08:56

## 2020-02-25 RX ADMIN — CLOPIDOGREL SCH MG: 75 TABLET, FILM COATED ORAL at 08:55

## 2020-02-25 RX ADMIN — METOPROLOL TARTRATE SCH MG: 25 TABLET, FILM COATED ORAL at 08:55

## 2020-02-26 NOTE — DS
CC:  [facility]; Dr. Jason Krishna *

 

DISCHARGE SUMMARY:

 

DATE OF ADMISSION:  20

 

DATE OF DISCHARGE:  20

 

PRIMARY CARE PROVIDER:  [facility].

 

OTHER PROVIDER:  Dr. Jason Krishna.

 

ATTENDING PHYSICIAN:  Dr. Esther Gaffney * (dictated by IVANA Prajapati)

 

DISCHARGE DIAGNOSIS:  Non-ST elevation myocardial infarction.

 

SECONDARY DIAGNOSIS:  

1.  Tobacco abuse.

2.  The patient has been lost to follow up.

 

STUDIES WHILE IN THE HOSPITAL:

1.  Chest x-ray.  Impression:  Hyperinflation consistent with COPD.  No active 
cardiopulmonary disease.

2.  Transthoracic echocardiogram.  Summary:  LV systolic function normal, 
estimated EF 55% to 60%.  Wall motion normal.  No regional wall motion 
abnormalities.  Trace MR.  No evidence of AS.  Trace TR.  Pulmonary artery 
systolic pressure cannot be accurately estimated.

3.  EKG:  Rate 89 without ST changes, T inversion in aVL, which resolved in 
later EKGs.  No ST elevation or depression.

4.  Cardiac catheterization.  Summary:  LAD:  Proximal vessel lesion:  There is 
a 50% stenosis.  Mid vessel lesion:  There is a 50% stenosis.  Right coronary 
artery: Proximal vessel lesion:  There is a 90% stenosis.  Recommendations:  
Medical therapy.  Right coronary is too small vessel for intervention.  Plavix 
for 6 months, aspirin, and statins.

 

CONSULTATIONS WHILE IN THE HOSPITAL:  Cardiology:  Presents to the emergency 
room with shortness of breath, mild chest pain.  EKG:  NSR, no obvious ST-T 
wave abnormalities.  Echocardiogram normal.  Unclear if symptoms are from acute 
coronary syndrome or Takotsubo.  Normal echo.  Recommend exercise nuclear 
stress test.  The patient may benefit from going directly to cardiac 
catheterization.  Further recommendations pending.  The patient is on heparin, 
aspirin, atorvastatin, metoprolol.

 

DISCHARGE MEDICATIONS:  Home medications:  None.

 

New home medications:

1.  Aspirin 81 mg p.o. daily.

2.  Atorvastatin 40 mg p.o. at bedtime.

3.  Clopidogrel 75 mg p.o. daily x6 months.

4.  Metoprolol tartrate 12.5 mg p.o. q.12 hours.

5.  Nicotine patch 21 mg/24 hours 1 patch transdermally daily.

 

HISTORY OF PRESENT ILLNESS/HOSPITAL COURSE:  Ms. Mitchell is a 60-year-old female 
with a past medical history of tobacco abuse who does not seek regular medical 
attention, who presented to the ER on 20 with complaints of chest pain, 
shortness of breath, and headache.  She reports frequent stress and reports 
that her brother passed away and she has been arranging the .  For full 
and complete details, please see the history and physical dictated by Gloria Neal NP, but in short, the patient presents with the above symptoms.  In 
the ER, she was noted to have an elevated troponin that peaked at 0.51 and then 
trended back down.  EKG was relatively unremarkable without ST changes.  The 
patient received 324 mg of aspirin and was started on heparin drip.  An 
echocardiogram was obtained and was within normal limits without regional wall 
motion abnormalities.  Cardiology was consulted and recommended exercise stress 
testing versus heart cath.  The patient ultimately ended up getting a heart 
cath that revealed LAD with 50% stenosis, RCA with 90% stenosis, although RCA 
was too small for intervention.  Recommendations were made for statin as well 
as 6 months of dual antiplatelet therapy.  The patient has been started on 
these medications and has tolerated them well.  She does have a past medical 
history of tobacco abuse and was given nicotine patches throughout her stay.  
She has requested nicotine patches at discharge, which have been sent to her 
pharmacy.  The patient also had a chest x-ray that revealed probable COPD, 
which is a likely diagnosis that she carries especially with her 40-pack-year 
smoking history.  She did not appear to be in exacerbation throughout her stay.
  Cardiology was consulted prior to the patient's discharge and they have no 
further recommendations.  She will continue on dual-antiplatelet therapy for 6 
months as well as atorvastatin and metoprolol.  Ms. Mitchell is stable for 
discharge.

 

PHYSICAL EXAMINATION:  Vital Signs:  Temperature 97.6 oral, heart rate 65, 
respiratory rate 16, oxygen saturation 98% on room air, blood pressure 127/59. 
General:  Ms. Mitchell is a well-developed, well-nourished, thin, middle-aged 
white woman, who appears somewhat older than her stated age.  She is sitting up 
in bed. She is breathing comfortably on room air and appears to be in no acute 
distress. HEENT:  PERRL, EOMI.  Visual fields are grossly intact.  Hearing is 
grossly intact. Oral mucous membranes are moist.  There are no lesions.  The 
pharynx is clear.  The tongue is at midline.  Palate elevates symmetrically.  
Cardiovascular:  Regular rate and rhythm with S1, S2 present.  There are no 
murmurs, rubs, clicks, or gallops.  There is no JVD or peripheral edema.  
Pulmonary:  Symmetrical chest expansion without use of accessory muscles.  
Clear to auscultation bilaterally without rhonchi, wheeze, or rales.  Abdomen:  
Bowel sounds in all quadrants.  Soft and nontender to palpation.  Extremities:  
The patient has a clean, dry, intact dressing in place to the right radial area
, insertion site for cardiac cath.  She is able to move all of her extremities.
  She has a steady gait without impairment. Neuro:  The patient is awake, she 
is alert and oriented x3.  Cranial nerves II through XII are grossly intact.

 

DISCHARGE PLAN:  Ms. Mitchell will be discharged to home.

 

CONDITION:  Good.

 

DIET:  Heart healthy.

 

ACTIVITY:  As tolerated.

 

MEDICATIONS:

1.  Continue clopidogrel for 6 months, prescription given.

2.  Continue aspirin, metoprolol, atorvastatin.  Follow with primary care 
provider for refills.

3.  Continue smoking cessation, nicotine patch prescription sent to pharmacy.

 

EDUCATION:

1.  Follow up with [facility] as scheduled.

2.  Follow up with Dr. Krishna in 1 week.

3.  Return to the ER or nearest hospital if you experience any return or 
worsening of symptoms, chest pain or discomfort, shortness of breath, dizziness
, lightheadedness, loss of consciousness, high fevers, chills, night sweats, or 
any other worrisome signs or symptoms.

 

This is a summarized report of a complex medical history and hospital stay.  
For further details, please see the entire medical record. TIME SPENT:  
Approximately 35 minutes was spent on this discharge, greater than half that 
time was spent face-to-face with the patient discussing discharge plans and 
instructions.

 

____________________________________ 

IVANA YOO

 

[id number]/[facility] #: [id number]

HORTENCIA